# Patient Record
Sex: FEMALE | Race: OTHER | HISPANIC OR LATINO | URBAN - METROPOLITAN AREA
[De-identification: names, ages, dates, MRNs, and addresses within clinical notes are randomized per-mention and may not be internally consistent; named-entity substitution may affect disease eponyms.]

---

## 2022-08-28 ENCOUNTER — EMERGENCY (EMERGENCY)
Facility: HOSPITAL | Age: 27
LOS: 1 days | Discharge: HOME | End: 2022-08-28
Attending: EMERGENCY MEDICINE
Payer: COMMERCIAL

## 2022-08-28 VITALS
RESPIRATION RATE: 16 BRPM | SYSTOLIC BLOOD PRESSURE: 128 MMHG | HEART RATE: 80 BPM | HEIGHT: 60 IN | DIASTOLIC BLOOD PRESSURE: 79 MMHG | WEIGHT: 145.06 LBS | OXYGEN SATURATION: 100 % | TEMPERATURE: 98 F

## 2022-08-28 PROCEDURE — 99283 EMERGENCY DEPT VISIT LOW MDM: CPT

## 2022-08-28 PROCEDURE — 99282 EMERGENCY DEPT VISIT SF MDM: CPT

## 2022-08-28 RX ORDER — SIMETHICONE 80 MG/1
160 TABLET, CHEWABLE ORAL ONCE
Refills: 0 | Status: COMPLETED | OUTPATIENT
Start: 2022-08-28 | End: 2022-08-28

## 2022-08-28 RX ADMIN — SIMETHICONE 160 MILLIGRAM(S): 80 TABLET, CHEWABLE ORAL at 17:34

## 2022-08-28 NOTE — ED PROVIDER NOTE - CLINICAL SUMMARY MEDICAL DECISION MAKING FREE TEXT BOX
27 year-old female, presents with cc diarrhea today after feeling better in the last few days. Well-appearing, vital signs within normal limits, afebrile. Abdo SNTND. No CVAT. Diarrhea and bloating mostly likely 2/2 taking prunes and having loose stools. Recommended monitoring or symptoms and PMD follow up. At this time, lower suspicion for acute intra-abdominal infection or surgical emergent diagnosis. Will need to follow up with PMD.

## 2022-08-28 NOTE — ED ADULT TRIAGE NOTE - CHIEF COMPLAINT QUOTE
C/o "diarrhea x few days and I was diagnosed with food poising and felt better y/d with normal Bm. This morning I ate prunes and the diarrhea started again".

## 2022-08-28 NOTE — ED ADULT NURSE NOTE - OBJECTIVE STATEMENT
States she has diarrhea x4 days and was diagnosed with food poisoning felt better yesterday but started to have diarrhea again today .

## 2022-08-28 NOTE — ED PROVIDER NOTE - NS ED ATTENDING STATEMENT MOD
This was a shared visit with the JULIETH. I reviewed and verified the documentation and independently performed the documented:

## 2022-08-28 NOTE — ED PROVIDER NOTE - OBJECTIVE STATEMENT
27 year-old female, presents with cc diarrhea. ~ 5 days ago started having 1-2 loose stools per day and was told by her PMD that she had food poisoning. Symptoms improved until today. Felt constipated so ate prunes and drank water after which she had 4-5 episodes of loose non-bloody stools and now feels bloated which she didn't feel prior to taking the prunes. Denies any fever in the last week, no bloody or black stools, no chills, body aches or any other complaints.

## 2022-08-28 NOTE — ED PROVIDER NOTE - ATTENDING APP SHARED VISIT CONTRIBUTION OF CARE
I conducted a face-to-face interaction with patient and I agree with NP documentation and plan.  Pt presents with diarrhea/loose stool, no fever/abd pain  A/P:  Pt well-appearing with nontender abdomen, advised hydration/symptomatic care, return precautions

## 2022-08-28 NOTE — ED PROVIDER NOTE - PATIENT PORTAL LINK FT
You can access the FollowMyHealth Patient Portal offered by [facility] by registering at the following website: [email or web address]. By joining [facility]’s FollowMyHealth portal, you will also be able to view your health information using other applications (apps) compatible with our system.

## 2025-06-11 NOTE — ED ADULT TRIAGE NOTE - BP NONINVASIVE DIASTOLIC (MM HG)
Received request via: Patient    Was the patient seen in the last year in this department? Yes    Does the patient have an active prescription (recently filled or refills available) for medication(s) requested? No    Pharmacy Name: [facility] on [address]    Does the patient have [facility] and need 100-day supply? (This applies to ALL medications) Yes, quantity updated to 100 days   79